# Patient Record
Sex: FEMALE | Race: WHITE | URBAN - METROPOLITAN AREA
[De-identification: names, ages, dates, MRNs, and addresses within clinical notes are randomized per-mention and may not be internally consistent; named-entity substitution may affect disease eponyms.]

---

## 2021-09-19 ENCOUNTER — EMERGENCY (EMERGENCY)
Facility: HOSPITAL | Age: 26
LOS: 1 days | Discharge: HOME | End: 2021-09-19
Attending: EMERGENCY MEDICINE | Admitting: EMERGENCY MEDICINE
Payer: COMMERCIAL

## 2021-09-19 VITALS
DIASTOLIC BLOOD PRESSURE: 77 MMHG | HEART RATE: 67 BPM | SYSTOLIC BLOOD PRESSURE: 107 MMHG | OXYGEN SATURATION: 98 % | WEIGHT: 110.23 LBS | HEIGHT: 67 IN | TEMPERATURE: 98 F | RESPIRATION RATE: 15 BRPM

## 2021-09-19 DIAGNOSIS — S00.83XA CONTUSION OF OTHER PART OF HEAD, INITIAL ENCOUNTER: ICD-10-CM

## 2021-09-19 DIAGNOSIS — S06.0X0A CONCUSSION WITHOUT LOSS OF CONSCIOUSNESS, INITIAL ENCOUNTER: ICD-10-CM
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
## 2021-09-19 DIAGNOSIS — S09.90XA UNSPECIFIED INJURY OF HEAD, INITIAL ENCOUNTER: ICD-10-CM

## 2021-09-19 DIAGNOSIS — Z86.19 PERSONAL HISTORY OF OTHER INFECTIOUS AND PARASITIC DISEASES: ICD-10-CM
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
## 2021-09-19 DIAGNOSIS — Z91.040 LATEX ALLERGY STATUS: ICD-10-CM
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
## 2021-09-19 DIAGNOSIS — Z88.8 ALLERGY STATUS TO OTHER DRUGS, MEDICAMENTS AND BIOLOGICAL SUBSTANCES: ICD-10-CM

## 2021-09-19 DIAGNOSIS — K90.0 CELIAC DISEASE: ICD-10-CM

## 2021-09-19 PROCEDURE — 70450 CT HEAD/BRAIN W/O DYE: CPT | Mod: 26

## 2021-09-19 PROCEDURE — 99284 EMERGENCY DEPT VISIT MOD MDM: CPT

## 2021-09-19 RX ORDER — ONDANSETRON 8 MG/1
4 TABLET, FILM COATED ORAL ONCE
Refills: 0 | Status: COMPLETED | OUTPATIENT
Start: 2021-09-19 | End: 2021-09-19

## 2021-09-19 RX ORDER — ACETAMINOPHEN 500 MG
975 TABLET ORAL ONCE
Refills: 0 | Status: COMPLETED | OUTPATIENT
Start: 2021-09-19 | End: 2021-09-19

## 2021-09-19 RX ADMIN — Medication 975 MILLIGRAM(S): at 17:25

## 2021-09-19 NOTE — ED PROVIDER NOTE - CLINICAL SUMMARY MEDICAL DECISION MAKING FREE TEXT BOX
25 y/o F with Hx of celiac disease and lyme disease presents to the ED for evaluation s/p head injury. On exam, Pt appears well and in no acute distress. Neurological exam is unremarkable. Presentation is most consistent for concussion. Plan for pain medications, Zofran, and CT head to r/o intracranial injuries. Will reassess clinically after results have been obtained. 25 y/o F with Hx of celiac disease and lyme disease presents to the ED for evaluation s/p head injury. On exam, Pt appears well and in no acute distress. Neurological exam is unremarkable. Presentation is most consistent for concussion. Plan for pain medications, Zofran, and CT head to r/o intracranial injuries. Will reassess clinically after results have been obtained.    neuro intact, negative head CT, sx mitigated with oral medications, d/c home with supportive care measures and return precautions.

## 2021-09-19 NOTE — ED PROVIDER NOTE - PHYSICAL EXAMINATION
VITAL SIGNS: I have reviewed nursing notes and confirm.  CONSTITUTIONAL: Well-developed; well-nourished; in no acute distress.  SKIN: Ecchymosis to the chin.   HEAD: Normocephalic; atraumatic.  EYES: PERRL, EOM intact; conjunctiva and sclera clear.  ENT: No nasal discharge; airway clear.  NECK: Supple; non tender.  CARD: S1, S2 normal; no murmurs, gallops, or rubs. Regular rate and rhythm.  RESP: Unlabored. No wheezes, rales or rhonchi.  ABD: soft; non-distended; non-tender  MSK: Normal ROM. No cyanosis or edema. Non-ttp all ext, distal pulses intact. Bite is intact.   LYMPH: No acute cervical adenopathy.  NEURO: Clear and coherent speech. Normal cranial nerve exam. Normal finger to nose and WILLIAN. Steady gait. No pronator drift.   PSYCH: Cooperative, appropriate.

## 2021-09-19 NOTE — ED ADULT TRIAGE NOTE - CHIEF COMPLAINT QUOTE
patient walk in with c/o concussion s/p fall last night while skateboarding; was wearing helmet; hit forehead and chin on ground after falling off a ramp; bruise noted under chin; left shoulder pain; scrapes/abrasions noted to outer left thigh

## 2021-09-19 NOTE — ED PROVIDER NOTE - OBJECTIVE STATEMENT
27 y/o F with PMHx of celiac disease and lyme disease presents to the ED for evaluation s/p head injury. Pt reports she was skateboarding last night. She went up a ramp and was going down when she felt her boy was not positioned correctly. Pt subsequently fell off the skateboard and hit her forehead against the pavement with a whiplash. She states she was wearing a helmet at the time. Pt did not have LOC after the incident. Pt was able to ambulate home but noted feeling "off" for 1-2 hours. She also noted bruising to her chin. This morning, Pt woke up with a throbbing HA. Pt decided to seek evaluation at the ED for concerns of a possible concussion. She did not take any medications for her symptoms prior to arrival. Pt denies visual changes, vomiting, or any additional injuries.

## 2021-09-19 NOTE — ED PROVIDER NOTE - PATIENT PORTAL LINK FT
You can access the FollowMyHealth Patient Portal offered by [facility] by registering at the following website: [email or web address]. By joining [facility]’s FollowMyHealth portal, you will also be able to view your health information using other applications (apps) compatible with our system.

## 2021-09-19 NOTE — ED ADULT NURSE NOTE - HIV OFFER
Opt out Elidel Counseling: Patient may experience a mild burning sensation during topical application. Elidel is not approved in children less than 2 years of age. There have been case reports of hematologic and skin malignancies in patients using topical calcineurin inhibitors although causality is questionable.

## 2024-01-23 NOTE — ED ADULT NURSE NOTE - BREATHING, MLM
[Time Spent: ___ minutes] : I have spent [unfilled] minutes of time on the encounter. Spontaneous, unlabored and symmetrical